# Patient Record
Sex: FEMALE | Race: WHITE
[De-identification: names, ages, dates, MRNs, and addresses within clinical notes are randomized per-mention and may not be internally consistent; named-entity substitution may affect disease eponyms.]

---

## 2017-01-18 VITALS — SYSTOLIC BLOOD PRESSURE: 118 MMHG | DIASTOLIC BLOOD PRESSURE: 59 MMHG

## 2017-04-27 ENCOUNTER — HOSPITAL ENCOUNTER (OUTPATIENT)
Dept: HOSPITAL 35 - CATH | Age: 82
Setting detail: OBSERVATION
LOS: 1 days | Discharge: HOME | End: 2017-04-28
Attending: INTERNAL MEDICINE | Admitting: INTERNAL MEDICINE
Payer: COMMERCIAL

## 2017-04-27 VITALS — BODY MASS INDEX: 24.07 KG/M2 | WEIGHT: 127.5 LBS | HEIGHT: 61 IN

## 2017-04-27 VITALS — SYSTOLIC BLOOD PRESSURE: 158 MMHG | DIASTOLIC BLOOD PRESSURE: 74 MMHG

## 2017-04-27 VITALS — DIASTOLIC BLOOD PRESSURE: 60 MMHG | SYSTOLIC BLOOD PRESSURE: 114 MMHG

## 2017-04-27 VITALS — SYSTOLIC BLOOD PRESSURE: 137 MMHG | DIASTOLIC BLOOD PRESSURE: 67 MMHG

## 2017-04-27 VITALS — SYSTOLIC BLOOD PRESSURE: 122 MMHG | DIASTOLIC BLOOD PRESSURE: 63 MMHG

## 2017-04-27 DIAGNOSIS — I10: ICD-10-CM

## 2017-04-27 DIAGNOSIS — F41.9: ICD-10-CM

## 2017-04-27 DIAGNOSIS — K58.9: ICD-10-CM

## 2017-04-27 DIAGNOSIS — Z79.899: ICD-10-CM

## 2017-04-27 DIAGNOSIS — I47.1: Primary | ICD-10-CM

## 2017-04-27 DIAGNOSIS — K21.9: ICD-10-CM

## 2017-04-27 LAB
ALBUMIN SERPL-MCNC: 4.1 G/DL (ref 3.4–5)
ALP SERPL-CCNC: 129 U/L (ref 46–116)
ALT SERPL-CCNC: 72 U/L (ref 30–65)
ANION GAP SERPL CALC-SCNC: 10 MMOL/L (ref 7–16)
APTT BLD: 27.7 SECONDS (ref 24.5–32.8)
AST SERPL-CCNC: 45 U/L (ref 15–37)
BASOPHILS NFR BLD AUTO: 0.4 % (ref 0–2)
BILIRUB SERPL-MCNC: 0.7 MG/DL
BUN SERPL-MCNC: 14 MG/DL (ref 7–18)
CALCIUM SERPL-MCNC: 9.1 MG/DL (ref 8.5–10.1)
CHLORIDE SERPL-SCNC: 106 MMOL/L (ref 98–107)
CO2 SERPL-SCNC: 26 MMOL/L (ref 21–32)
CREAT SERPL-MCNC: 0.8 MG/DL (ref 0.6–1)
EOSINOPHIL NFR BLD: 1.8 % (ref 0–3)
ERYTHROCYTE [DISTWIDTH] IN BLOOD BY AUTOMATED COUNT: 13.5 % (ref 10.5–14.5)
GLUCOSE SERPL-MCNC: 99 MG/DL (ref 74–106)
GRANULOCYTES NFR BLD MANUAL: 63.8 % (ref 36–66)
HCT VFR BLD CALC: 36.3 % (ref 37–47)
HGB BLD-MCNC: 12.2 GM/DL (ref 12–15)
INR PPP: 1
LYMPHOCYTES NFR BLD AUTO: 26.8 % (ref 24–44)
MANUAL DIFFERENTIAL PERFORMED BLD QL: NO
MCH RBC QN AUTO: 30.7 PG (ref 26–34)
MCHC RBC AUTO-ENTMCNC: 33.5 G/DL (ref 28–37)
MCV RBC: 91.6 FL (ref 80–100)
MONOCYTES NFR BLD: 7.2 % (ref 1–8)
NEUTROPHILS # BLD: 3.7 THOU/UL (ref 1.4–8.2)
PLATELET # BLD: 311 THOU/UL (ref 150–400)
POTASSIUM SERPL-SCNC: 3.6 MMOL/L (ref 3.5–5.1)
PROT SERPL-MCNC: 7.6 G/DL (ref 6.4–8.2)
PROTHROMBIN TIME: 9.8 SECONDS (ref 9.3–11.4)
RBC # BLD AUTO: 3.96 MIL/UL (ref 4.2–5)
SODIUM SERPL-SCNC: 142 MMOL/L (ref 136–145)
WBC # BLD AUTO: 5.9 THOU/UL (ref 4–11)

## 2017-04-27 PROCEDURE — 70005: CPT

## 2017-04-27 PROCEDURE — 62110: CPT

## 2017-04-27 PROCEDURE — 62900: CPT

## 2017-04-28 VITALS — DIASTOLIC BLOOD PRESSURE: 64 MMHG | SYSTOLIC BLOOD PRESSURE: 136 MMHG

## 2017-04-28 VITALS — DIASTOLIC BLOOD PRESSURE: 61 MMHG | SYSTOLIC BLOOD PRESSURE: 116 MMHG

## 2018-02-06 ENCOUNTER — HOSPITAL ENCOUNTER (OUTPATIENT)
Dept: HOSPITAL 61 - NM | Age: 83
Discharge: HOME | End: 2018-02-06
Attending: INTERNAL MEDICINE
Payer: MEDICARE

## 2018-02-06 DIAGNOSIS — Z79.01: ICD-10-CM

## 2018-02-06 DIAGNOSIS — R07.9: Primary | ICD-10-CM

## 2018-02-06 PROCEDURE — 93017 CV STRESS TEST TRACING ONLY: CPT

## 2018-02-06 PROCEDURE — 78452 HT MUSCLE IMAGE SPECT MULT: CPT

## 2018-02-06 PROCEDURE — 96376 TX/PRO/DX INJ SAME DRUG ADON: CPT

## 2018-02-06 PROCEDURE — 96374 THER/PROPH/DIAG INJ IV PUSH: CPT

## 2021-12-28 ENCOUNTER — HOSPITAL ENCOUNTER (OUTPATIENT)
Dept: HOSPITAL 61 - NM | Age: 86
End: 2021-12-28
Attending: INTERNAL MEDICINE
Payer: MEDICARE

## 2021-12-28 DIAGNOSIS — R94.31: Primary | ICD-10-CM

## 2021-12-28 DIAGNOSIS — R06.09: ICD-10-CM

## 2021-12-28 DIAGNOSIS — R07.89: ICD-10-CM

## 2021-12-28 PROCEDURE — A9500 TC99M SESTAMIBI: HCPCS

## 2021-12-28 PROCEDURE — 78452 HT MUSCLE IMAGE SPECT MULT: CPT

## 2021-12-28 PROCEDURE — 93017 CV STRESS TEST TRACING ONLY: CPT

## 2021-12-28 NOTE — RAD
MR#: [id number]

Account#: [id number]

Accession#: [id number]

Date of Study: 12/28/2021

Ordering Physician: KASHMIR WALDRON, 

Referring Physician: THALIA JOHANSEN Tech: ANDRIA Stokes, ARRT (R) (N)





--------------- APPROVED REPORT --------------





Test Type:          Exercise

Stress Nurse/Tech: ANT OCAMPO

Test Indications: CHEST PAIN, ABARCA

Cardiac History: HTN, ABLATION- SEE EMR

Medications:     SEE EMR

Medical History: SEE EMR

Resting ECG:     SR

Resting Heart Rate: 74 bpm

Resting Blood Pressure: 138/65mmHg

Pretest Chest Pain: No chest pain



Nurse/Tech Notes

S1,S2, LUNGS CTA, DENIES CH OR SOA AT THIS TIME. VSS.

Consent: The procedure was explained to the patient in lay terms. Informed consent was witnessed. Augie
eout was entered into [employer]. History and Stress Test performed by RT Saad (R) (N)



Pharm. Details

Pharmacologic stress testing was performed using 0.4mg per 5ml of regadenoson given intravenously ove
r 7-10 seconds.



Stress Symptoms

PT TOLERATED TREADMILL TEST WELL, TARGET HEART RATE REACHED. VSS- WNL. PT WAS "WINDED", DENIED CHEST 
PAIN.



POST EXERCISE

Reason for Termination: Reached target heart rate

Target HR: 114

Max HR: 185 bpm

162% of Maximum Predicted HR: 114 bpm

Exercise duration: 2:22 min:sec, 1 Stage

Exercise capacity: 4.6METs

Max Blood Pressure: 180/79mmHg

Blood Pressure response to exercise: Normal blood pressure response during stress.

Heart Rate response to exercise: WNL

Chest Pain: No. 

Arrhythmia: . PAC'S AND PVC'S/COUPLET NOTED ON EKG- RETURNED TO BASELINE AT REST



INTERPRETATION

Stress EKG Conclusion: Mildly abnormal EKG with 1 mm upsloping ST segment depressions.  Nondiagnostic
 for any ischemia.



Imaging Protocol

IMAGE PROTOCOL: Rest Tc-99m/stress Tc-99m 1 day



Rest:            Stress:         Viability:   

Radiopharm.Tc99m JfybyssklAr28l Sestamibi

Dose10.9mCi            30.2mCi            

Img Date  12/28/2021 12/28/2021      

Inj-Img [id number].           60min.           



Rest Admin Site:IV - Right ForearmAdministrator:ANDRIA Stokes, ARRT (R)(N)

Stress Admin Site: IV - Right ForearmAdministrator: Apurva Hickey, RT (R)(N)



STRESS DATA

End Diast. Vol.25.0mlAv. Heart Rate99.0bpm

End Syst. Vol.3.0mlCO Index BSA0.0L/min

Myocardial Mass58.0gEject. Eckpxahj44.0%



Stress Rates

Pk. Fill Rate8.46EDV/secLVtime Pk. Fill 88.15msec

Pk. Empty Rate4.40ESV/secLVtime Pk. Eject69.08msec

1/3 Pk. Fill2.80EDV/sec



Stress Scores

Regional WT2.00Summed WT15.00

Regional WM0.00Summed WM3.00



LV Perfusion

Normal perfusion at stress.  Significant artifact on rest images.



Wall Motion

Normal wall motion with an EF of greater than 70%.



LV Perf. Quant

17 Seg. SSS0.00

17 Seg. SRS14.00

17 Seg. SDS0.00

Stress Defect Extent (% LAD)0.00Rest Defect Extent (% LAD)31.90Rev. Defect Extent (% LAD)0.00

Stress Defect Extent (% LCX) 0.00Rest Defect Extent (% LCX)40.00Rev. Defect Extent (% LCX)0.00

Stress Defect Extent (% RCA)0.00Rest Defect Extent (% RCA)16.70Rev. Defect Extent (% RCA)0.00

Stress Defect Extent (% CRISTIAN)0.00Rest Defect Extent (% CRISTIAN)30.90Rev. Defect Extent (% CRISTIAN)0.00



Other Information

Quality:Poor

Risk Assessment: Low Risk



Conclusion

1. Mildly abnormal EKG with 1 mm upsloping ST segment depression in the inferolateral leads with exer
cise.  Poor exercise capacity

2. Normal perfusion and stress

3. Resting images are nondiagnostic due to significant artifact

4. Normal EF greater than 70%

5. Low risk study overall.



Signed by : Tyson Hdez, 

Electronically Approved : 12/28/2021 18:48:06